# Patient Record
Sex: FEMALE | URBAN - NONMETROPOLITAN AREA
[De-identification: names, ages, dates, MRNs, and addresses within clinical notes are randomized per-mention and may not be internally consistent; named-entity substitution may affect disease eponyms.]

---

## 2021-09-09 ENCOUNTER — APPOINTMENT (RX ONLY)
Dept: URBAN - NONMETROPOLITAN AREA CLINIC 31 | Facility: CLINIC | Age: 56
Setting detail: DERMATOLOGY
End: 2021-09-09

## 2021-09-09 DIAGNOSIS — L85.3 XEROSIS CUTIS: ICD-10-CM

## 2021-09-09 DIAGNOSIS — L81.1 CHLOASMA: ICD-10-CM

## 2021-09-09 PROCEDURE — ? IN-HOUSE DISPENSING PHARMACY

## 2021-09-09 PROCEDURE — 99204 OFFICE O/P NEW MOD 45 MIN: CPT

## 2021-09-09 PROCEDURE — ? COUNSELING

## 2021-09-09 PROCEDURE — ? PRESCRIPTION

## 2021-09-09 RX ORDER — TRIAMCINOLONE ACETONIDE 1 MG/G
CREAM TOPICAL
Qty: 80 | Refills: 0 | Status: ERX | COMMUNITY
Start: 2021-09-09

## 2021-09-09 RX ADMIN — TRIAMCINOLONE ACETONIDE: 1 CREAM TOPICAL at 00:00

## 2021-09-09 ASSESSMENT — LOCATION SIMPLE DESCRIPTION DERM
LOCATION SIMPLE: LEFT FOREHEAD
LOCATION SIMPLE: RIGHT HAND
LOCATION SIMPLE: RIGHT CHEEK

## 2021-09-09 ASSESSMENT — LOCATION DETAILED DESCRIPTION DERM
LOCATION DETAILED: RIGHT RADIAL DORSAL HAND
LOCATION DETAILED: LEFT MEDIAL FOREHEAD
LOCATION DETAILED: RIGHT INFERIOR CENTRAL MALAR CHEEK

## 2021-09-09 ASSESSMENT — LOCATION ZONE DERM
LOCATION ZONE: FACE
LOCATION ZONE: HAND

## 2021-09-09 NOTE — PROCEDURE: IN-HOUSE DISPENSING PHARMACY
Product 58 Units Dispensed: 0
Product 25 Amount/Unit (Numbers Only): 30
Product 36 Price/Unit (In Dollars): 50.00
Product 4 Price/Unit (In Dollars): 45.00
Product 31 Unit Type: grams
Name Of Product 13: Clobetasol ointment
Name Of Product 27: Hydrocortisone 2.5% / Tranilast 0.5% / Levo 2%
Name Of Product 9: Hydrating 0.05% Tretinoin Cream
Name Of Product 25: Fluocinonide Cream
Product 52 Unit Type: mg
Product 33 Application Directions: Apply to face one daily in the morning
Name Of Product 32: Tacrolimus 0.1% Ointment
Product 42 Units Dispensed: 1
Name Of Product 36: Clobetasol solution
Product 4 Refills: 2
Product 5 Application Directions: Use on body in shower.
Product 13 Price/Unit (In Dollars): 50
Product 24 Refills: 3
Product 34 Unit Type: bottle(s)
Product 24 Application Directions: Apply twice daily to scalp
Product 36 Application Directions: Apply twice daily for up to 3 weeks
Product 46 Price/Unit (In Dollars): 50.0
Product 9 Application Directions: Apply pea sized amount to entire face nightly as tolerated. Start 2 x weekly and increase to nightly if well-tolerated
Product 14 Application Directions: Apply to affected areas 2-3 times a week leave in overnight, wash in AM
Name Of Product 3: Adapalene 0.3% Gel
Product 13 Application Directions: Apply twice daily to affected area on hand as needed.
Name Of Product 22: Clobetasol Cream
Name Of Product 28: Triamcinolone Cream (30 gram)
Name Of Product 31: Urea 40% cream
Product 1 Amount/Unit (Numbers Only): 120
Product 29 Price/Unit (In Dollars): 65.00
Product 12 Application Directions: Apply lightly to face daily QD
Product 44 Amount/Unit (Numbers Only): 240
Name Of Product 71: Female hormonal cream
Name Of Product 1: Sodium sulfacetamide 8%
Product 23 Amount/Unit (Numbers Only): 60
Product 23 Application Directions: Apply BID to rash for up to 2 weeks.
Name Of Product 41: AK Imiquimod Gel
Name Of Product 42: Skin Brightening Hydroquinone 8% Combination
Name Of Product 5: BP 8% Acne Wash
Send Charges To Patient Encounter: Yes
Product 44 Price/Unit (In Dollars): 90.00
Name Of Product 26: Ketoconazole/ hydrocortisone cream
Name Of Product 21: Anti-Fungal Shampoo
Product 51 Refills: 10
Name Of Product 44: Anti-aging Body Tretinoin 0.05% Cream
Name Of Product 43: Alopecia Topical Solution for Men
Name Of Product 24: Fluocinolone Scalp and Body Oil
Product 14 Unit Type: unit(s)
Product 34 Application Directions: Apply solution to scalp nightly
Product 25 Price/Unit (In Dollars): 40.00
Name Of Product 12: Rosacea Triple Combination Gel
Name Of Product 30: Seborrheic Dermatitis Shampoo
Name Of Product 8: Tretinoin 0.025% / Clindamycin Combination Cream
Name Of Product 11: Metronidazole Gel
Product 2 Application Directions: Apply pea sized amount to entire face nightly.
Product 42 Application Directions: Apply lightly to face as often as nightly, moisturize, wash in AM, wear sunscreen
Name Of Product 10: Female Hormonal Acne Gel
Name Of Product 34: Dermatitis topical
Product 46 Application Directions: Wash scalp with solution leave in for 10 minutes before washing out
Product 30 Price/Unit (In Dollars): 45
Product 10 Application Directions: Apply daily to face.
Name Of Product 6: Clindamycin Gel
Product 28 Price/Unit (In Dollars): 30.00
Name Of Product 51: Anti fungal nail solution
Detail Level: Zone
Product 3 Application Directions: Apply at night starting 2x weekly and increase to nightly as tolerated to face.
Product 51 Application Directions: Apply 1 drop daily to each affected nail
Name Of Product 4: BP 2.5% / Clindamycin Combination Gel
Product 43 Application Directions: Apply to scalp 1-2 x daily
Name Of Product 7: Dapsone 6% Gel
Name Of Product 2: Acne Triple Gel Combination
Name Of Product 29: Triamcinolone Cream (120 gram)
Product 1 Application Directions: Wash face 1-2x daily
Product 13 Refills: 5
Product 7 Application Directions: Use to spot treat on face
Product 31 Application Directions: Apply PRN to affected areas
Name Of Product 45: Skin brightening hydroquinone 8% combination sensitive skin